# Patient Record
Sex: FEMALE | Race: WHITE | Employment: FULL TIME | URBAN - METROPOLITAN AREA
[De-identification: names, ages, dates, MRNs, and addresses within clinical notes are randomized per-mention and may not be internally consistent; named-entity substitution may affect disease eponyms.]

---

## 2018-02-13 ENCOUNTER — HOSPITAL ENCOUNTER (EMERGENCY)
Facility: HOSPITAL | Age: 43
Discharge: HOME | End: 2018-02-13
Attending: EMERGENCY MEDICINE
Payer: COMMERCIAL

## 2018-02-13 ENCOUNTER — APPOINTMENT (OUTPATIENT)
Dept: CT IMAGING | Facility: HOSPITAL | Age: 43
End: 2018-02-13
Payer: COMMERCIAL

## 2018-02-13 VITALS
BODY MASS INDEX: 37.87 KG/M2 | WEIGHT: 264.56 LBS | RESPIRATION RATE: 18 BRPM | HEIGHT: 70 IN | TEMPERATURE: 99 F | DIASTOLIC BLOOD PRESSURE: 73 MMHG | SYSTOLIC BLOOD PRESSURE: 132 MMHG | OXYGEN SATURATION: 98 % | HEART RATE: 92 BPM

## 2018-02-13 DIAGNOSIS — N20.0 KIDNEY STONE: Primary | ICD-10-CM

## 2018-02-13 LAB
ALBUMIN SERPL-MCNC: 3.4 G/DL (ref 3.5–4.8)
ALP LIVER SERPL-CCNC: 137 U/L (ref 37–98)
ALT SERPL-CCNC: 18 U/L (ref 14–54)
AST SERPL-CCNC: 39 U/L (ref 15–41)
BASOPHILS # BLD AUTO: 0.04 X10(3) UL (ref 0–0.1)
BASOPHILS NFR BLD AUTO: 0.3 %
BILIRUB SERPL-MCNC: 0.4 MG/DL (ref 0.1–2)
BILIRUB UR QL STRIP.AUTO: NEGATIVE
BUN BLD-MCNC: 16 MG/DL (ref 8–20)
CALCIUM BLD-MCNC: 8.9 MG/DL (ref 8.3–10.3)
CHLORIDE: 111 MMOL/L (ref 101–111)
CO2: 20 MMOL/L (ref 22–32)
COLOR UR AUTO: YELLOW
CREAT BLD-MCNC: 1.24 MG/DL (ref 0.55–1.02)
EOSINOPHIL # BLD AUTO: 0.04 X10(3) UL (ref 0–0.3)
EOSINOPHIL NFR BLD AUTO: 0.3 %
ERYTHROCYTE [DISTWIDTH] IN BLOOD BY AUTOMATED COUNT: 19.6 % (ref 11.5–16)
GLUCOSE BLD-MCNC: 107 MG/DL (ref 70–99)
GLUCOSE UR STRIP.AUTO-MCNC: NEGATIVE MG/DL
HCT VFR BLD AUTO: 32.6 % (ref 34–50)
HGB BLD-MCNC: 9.1 G/DL (ref 12–16)
IMMATURE GRANULOCYTE COUNT: 0.05 X10(3) UL (ref 0–1)
IMMATURE GRANULOCYTE RATIO %: 0.4 %
KETONES UR STRIP.AUTO-MCNC: NEGATIVE MG/DL
LIPASE: 66 U/L (ref 73–393)
LYMPHOCYTES # BLD AUTO: 2.77 X10(3) UL (ref 0.9–4)
LYMPHOCYTES NFR BLD AUTO: 19.5 %
M PROTEIN MFR SERPL ELPH: 8.1 G/DL (ref 6.1–8.3)
MCH RBC QN AUTO: 18.1 PG (ref 27–33.2)
MCHC RBC AUTO-ENTMCNC: 27.9 G/DL (ref 31–37)
MCV RBC AUTO: 64.9 FL (ref 81–100)
MONOCYTES # BLD AUTO: 0.89 X10(3) UL (ref 0.1–1)
MONOCYTES NFR BLD AUTO: 6.3 %
NEUTROPHIL ABS PRELIM: 10.4 X10 (3) UL (ref 1.3–6.7)
NEUTROPHILS # BLD AUTO: 10.4 X10(3) UL (ref 1.3–6.7)
NEUTROPHILS NFR BLD AUTO: 73.2 %
NITRITE UR QL STRIP.AUTO: NEGATIVE
PH UR STRIP.AUTO: 6 [PH] (ref 4.5–8)
PLATELET # BLD AUTO: 583 10(3)UL (ref 150–450)
POTASSIUM SERPL-SCNC: 4.9 MMOL/L (ref 3.6–5.1)
PROT UR STRIP.AUTO-MCNC: 30 MG/DL
RBC # BLD AUTO: 5.02 X10(6)UL (ref 3.8–5.1)
RBC #/AREA URNS AUTO: >10 /HPF
RED CELL DISTRIBUTION WIDTH-SD: 43.9 FL (ref 35.1–46.3)
SODIUM SERPL-SCNC: 138 MMOL/L (ref 136–144)
SP GR UR STRIP.AUTO: 1.03 (ref 1–1.03)
UROBILINOGEN UR STRIP.AUTO-MCNC: <2 MG/DL
WBC # BLD AUTO: 14.2 X10(3) UL (ref 4–13)

## 2018-02-13 PROCEDURE — 99284 EMERGENCY DEPT VISIT MOD MDM: CPT

## 2018-02-13 PROCEDURE — 96374 THER/PROPH/DIAG INJ IV PUSH: CPT

## 2018-02-13 PROCEDURE — 96361 HYDRATE IV INFUSION ADD-ON: CPT

## 2018-02-13 PROCEDURE — 96375 TX/PRO/DX INJ NEW DRUG ADDON: CPT

## 2018-02-13 PROCEDURE — 85025 COMPLETE CBC W/AUTO DIFF WBC: CPT | Performed by: EMERGENCY MEDICINE

## 2018-02-13 PROCEDURE — 87086 URINE CULTURE/COLONY COUNT: CPT | Performed by: EMERGENCY MEDICINE

## 2018-02-13 PROCEDURE — 74176 CT ABD & PELVIS W/O CONTRAST: CPT

## 2018-02-13 PROCEDURE — 80053 COMPREHEN METABOLIC PANEL: CPT

## 2018-02-13 PROCEDURE — 83690 ASSAY OF LIPASE: CPT

## 2018-02-13 PROCEDURE — 85025 COMPLETE CBC W/AUTO DIFF WBC: CPT

## 2018-02-13 PROCEDURE — 81001 URINALYSIS AUTO W/SCOPE: CPT | Performed by: EMERGENCY MEDICINE

## 2018-02-13 PROCEDURE — 80053 COMPREHEN METABOLIC PANEL: CPT | Performed by: EMERGENCY MEDICINE

## 2018-02-13 PROCEDURE — 83690 ASSAY OF LIPASE: CPT | Performed by: EMERGENCY MEDICINE

## 2018-02-13 RX ORDER — SODIUM CHLORIDE 9 MG/ML
INJECTION, SOLUTION INTRAVENOUS CONTINUOUS
Status: DISCONTINUED | OUTPATIENT
Start: 2018-02-13 | End: 2018-02-13

## 2018-02-13 RX ORDER — OMEGA-3 FATTY ACIDS/FISH OIL 300-1000MG
CAPSULE ORAL
COMMUNITY
End: 2018-02-14

## 2018-02-13 RX ORDER — SULFAMETHOXAZOLE AND TRIMETHOPRIM 800; 160 MG/1; MG/1
1 TABLET ORAL 2 TIMES DAILY
Qty: 14 TABLET | Refills: 0 | Status: SHIPPED | OUTPATIENT
Start: 2018-02-13 | End: 2018-02-20

## 2018-02-13 RX ORDER — ONDANSETRON 4 MG/1
4 TABLET, ORALLY DISINTEGRATING ORAL ONCE
Status: COMPLETED | OUTPATIENT
Start: 2018-02-13 | End: 2018-02-13

## 2018-02-13 RX ORDER — KETOROLAC TROMETHAMINE 30 MG/ML
30 INJECTION, SOLUTION INTRAMUSCULAR; INTRAVENOUS ONCE
Status: COMPLETED | OUTPATIENT
Start: 2018-02-13 | End: 2018-02-13

## 2018-02-13 RX ORDER — HYDROMORPHONE HYDROCHLORIDE 1 MG/ML
1 INJECTION, SOLUTION INTRAMUSCULAR; INTRAVENOUS; SUBCUTANEOUS ONCE
Status: COMPLETED | OUTPATIENT
Start: 2018-02-13 | End: 2018-02-13

## 2018-02-13 RX ORDER — ONDANSETRON 4 MG/1
TABLET, ORALLY DISINTEGRATING ORAL
Status: COMPLETED
Start: 2018-02-13 | End: 2018-02-13

## 2018-02-13 RX ORDER — ONDANSETRON 2 MG/ML
4 INJECTION INTRAMUSCULAR; INTRAVENOUS ONCE
Status: COMPLETED | OUTPATIENT
Start: 2018-02-13 | End: 2018-02-13

## 2018-02-13 RX ORDER — KETOROLAC TROMETHAMINE 30 MG/ML
INJECTION, SOLUTION INTRAMUSCULAR; INTRAVENOUS
Status: COMPLETED
Start: 2018-02-13 | End: 2018-02-13

## 2018-02-13 RX ORDER — HYDROCODONE BITARTRATE AND ACETAMINOPHEN 5; 325 MG/1; MG/1
1-2 TABLET ORAL EVERY 4 HOURS PRN
Qty: 20 TABLET | Refills: 0 | Status: SHIPPED | OUTPATIENT
Start: 2018-02-13 | End: 2018-02-20

## 2018-02-13 NOTE — ED PROVIDER NOTES
Patient Seen in: [facility] Emergency Department    History   Patient presents with:  Abdomen/Flank Pain (GI/)  Urinary Symptoms (urologic)    Stated Complaint: flank pain, dysuria    HPI    53-year-old female comes the hospital complaint of havin nontender without CVA tenderness  Extremity no clubbing, cyanosis or edema noted.   Full range of motion noted without tenderness  Neuro: No focal deficits noted    ED Course     Labs Reviewed   COMP METABOLIC PANEL (14) - Abnormal; Notable for the followin KIDNEYSTONE 2D RNDR (NO IV,NO ORAL) (CPT=74176)  COMPARISON:  None. INDICATIONS:  abdominal pain  TECHNIQUE:  Unenhanced multislice CT scanning from above the kidneys to below the urinary bladder.   2D rendering are generated on the CT scanner workstation 4 mg (4 mg Intravenous Given 2/13/18 1233)   ketorolac tromethamine (TORADOL) 30 MG/ML injection 30 mg (30 mg Intravenous Given 2/13/18 9853)   HYDROmorphone HCl (DILAUDID) 1 MG/ML injection 1 mg (1 mg Intravenous Given 2/13/18 9182)       ED Course as of

## 2018-02-13 NOTE — ED INITIAL ASSESSMENT (HPI)
Patient c/o right flank pain, hx of kidney stones, feels the same. Sent here from immediate care  to r/o kidney stone vs infection as well. No known fevers.

## 2018-02-14 PROBLEM — R10.9 ACUTE RIGHT FLANK PAIN: Status: ACTIVE | Noted: 2018-02-14

## 2018-02-14 PROBLEM — N13.2 URETERAL STONE WITH HYDRONEPHROSIS: Status: ACTIVE | Noted: 2018-02-14

## 2019-07-20 PROCEDURE — 86255 FLUORESCENT ANTIBODY SCREEN: CPT | Performed by: PHYSICIAN ASSISTANT

## 2019-07-20 PROCEDURE — 86376 MICROSOMAL ANTIBODY EACH: CPT | Performed by: PHYSICIAN ASSISTANT

## 2019-08-05 ENCOUNTER — HOSPITAL ENCOUNTER (OUTPATIENT)
Dept: ULTRASOUND IMAGING | Facility: HOSPITAL | Age: 44
Discharge: HOME | End: 2019-08-05
Attending: OTOLARYNGOLOGY
Payer: COMMERCIAL

## 2019-08-05 DIAGNOSIS — E04.1 THYROID NODULE: ICD-10-CM

## 2019-08-05 PROCEDURE — 88173 CYTOPATH EVAL FNA REPORT: CPT | Performed by: OTOLARYNGOLOGY

## 2019-08-05 PROCEDURE — 10005 FNA BX W/US GDN 1ST LES: CPT | Performed by: OTOLARYNGOLOGY

## 2019-08-05 PROCEDURE — 10006 FNA BX W/US GDN EA ADDL: CPT | Performed by: OTOLARYNGOLOGY

## 2019-08-05 NOTE — PROCEDURES
[facility]  Procedure Note    Khushboo Guerin Patient Status:  Outpatient    1975 MRN [id number]   Location [address] [place] [address] Attending Hannah Cantrell MD   Hosp Day # 0 PCP Ania Patel MD     Procedure: US thyroid FNA

## 2019-08-08 NOTE — PROGRESS NOTES
Advised FNA appears benign. Scheduled f/u on 9/16/19 with Dr. Candida Panda. Patient verbalizes understanding.

## 2020-01-30 PROBLEM — B37.3 VAGINAL YEAST INFECTION: Status: ACTIVE | Noted: 2020-01-30

## 2021-04-25 ENCOUNTER — APPOINTMENT (OUTPATIENT)
Dept: GENERAL RADIOLOGY | Facility: HOSPITAL | Age: 46
DRG: 177 | End: 2021-04-25
Attending: EMERGENCY MEDICINE
Payer: COMMERCIAL

## 2021-04-25 ENCOUNTER — HOSPITAL ENCOUNTER (INPATIENT)
Facility: HOSPITAL | Age: 46
LOS: 3 days | Discharge: HOME | DRG: 177 | End: 2021-04-28
Attending: EMERGENCY MEDICINE | Admitting: INTERNAL MEDICINE
Payer: COMMERCIAL

## 2021-04-25 DIAGNOSIS — U07.1 PNEUMONIA DUE TO COVID-19 VIRUS: Primary | ICD-10-CM

## 2021-04-25 DIAGNOSIS — J12.82 PNEUMONIA DUE TO COVID-19 VIRUS: Primary | ICD-10-CM

## 2021-04-25 DIAGNOSIS — R09.02 HYPOXIA: ICD-10-CM

## 2021-04-25 PROBLEM — E66.2 CLASS 2 OBESITY WITH ALVEOLAR HYPOVENTILATION WITHOUT SERIOUS COMORBIDITY WITH BODY MASS INDEX (BMI) OF 37.0 TO 37.9 IN ADULT (HCC): Status: ACTIVE | Noted: 2021-04-25

## 2021-04-25 PROBLEM — G47.33 OBSTRUCTIVE SLEEP APNEA: Chronic | Status: ACTIVE | Noted: 2021-04-25

## 2021-04-25 PROCEDURE — 71045 X-RAY EXAM CHEST 1 VIEW: CPT | Performed by: EMERGENCY MEDICINE

## 2021-04-25 PROCEDURE — XW033E5 INTRODUCTION OF REMDESIVIR ANTI-INFECTIVE INTO PERIPHERAL VEIN, PERCUTANEOUS APPROACH, NEW TECHNOLOGY GROUP 5: ICD-10-PCS | Performed by: INTERNAL MEDICINE

## 2021-04-25 PROCEDURE — 3E0333Z INTRODUCTION OF ANTI-INFLAMMATORY INTO PERIPHERAL VEIN, PERCUTANEOUS APPROACH: ICD-10-PCS | Performed by: EMERGENCY MEDICINE

## 2021-04-25 PROCEDURE — 99223 1ST HOSP IP/OBS HIGH 75: CPT | Performed by: INTERNAL MEDICINE

## 2021-04-25 RX ORDER — BENZONATATE 100 MG/1
100 CAPSULE ORAL 3 TIMES DAILY PRN
Status: DISCONTINUED | OUTPATIENT
Start: 2021-04-25 | End: 2021-04-28

## 2021-04-25 RX ORDER — ACETAMINOPHEN 160 MG
2000 TABLET,DISINTEGRATING ORAL DAILY
Status: DISCONTINUED | OUTPATIENT
Start: 2021-04-25 | End: 2021-04-28

## 2021-04-25 RX ORDER — ENOXAPARIN SODIUM 100 MG/ML
40 INJECTION SUBCUTANEOUS DAILY
Status: DISCONTINUED | OUTPATIENT
Start: 2021-04-25 | End: 2021-04-28

## 2021-04-25 RX ORDER — ASCORBIC ACID 500 MG
1000 TABLET ORAL DAILY
Status: DISCONTINUED | OUTPATIENT
Start: 2021-04-25 | End: 2021-04-28

## 2021-04-25 RX ORDER — DEXAMETHASONE SODIUM PHOSPHATE 10 MG/ML
10 INJECTION, SOLUTION INTRAMUSCULAR; INTRAVENOUS ONCE
Status: COMPLETED | OUTPATIENT
Start: 2021-04-25 | End: 2021-04-25

## 2021-04-25 RX ORDER — MELATONIN
3 NIGHTLY PRN
Status: DISCONTINUED | OUTPATIENT
Start: 2021-04-25 | End: 2021-04-28

## 2021-04-25 RX ORDER — ACETAMINOPHEN 500 MG
1000 TABLET ORAL ONCE
Status: COMPLETED | OUTPATIENT
Start: 2021-04-25 | End: 2021-04-25

## 2021-04-25 RX ORDER — GUAIFENESIN 600 MG
600 TABLET, EXTENDED RELEASE 12 HR ORAL 2 TIMES DAILY
Status: DISCONTINUED | OUTPATIENT
Start: 2021-04-25 | End: 2021-04-28

## 2021-04-25 RX ORDER — ZINC SULFATE 50(220)MG
220 CAPSULE ORAL 2 TIMES DAILY
Status: DISCONTINUED | OUTPATIENT
Start: 2021-04-25 | End: 2021-04-28

## 2021-04-25 RX ORDER — ALBUTEROL SULFATE 90 UG/1
2 AEROSOL, METERED RESPIRATORY (INHALATION) EVERY 6 HOURS PRN
COMMUNITY

## 2021-04-25 RX ORDER — DEXAMETHASONE SODIUM PHOSPHATE 10 MG/ML
10 INJECTION, SOLUTION INTRAMUSCULAR; INTRAVENOUS DAILY
Status: DISCONTINUED | OUTPATIENT
Start: 2021-04-26 | End: 2021-04-28

## 2021-04-25 RX ORDER — ACETAMINOPHEN 325 MG/1
650 TABLET ORAL EVERY 6 HOURS PRN
Status: DISCONTINUED | OUTPATIENT
Start: 2021-04-25 | End: 2021-04-28

## 2021-04-25 RX ORDER — ALBUTEROL SULFATE 90 UG/1
4 AEROSOL, METERED RESPIRATORY (INHALATION) EVERY 4 HOURS PRN
Status: DISCONTINUED | OUTPATIENT
Start: 2021-04-25 | End: 2021-04-28

## 2021-04-25 RX ORDER — ONDANSETRON 2 MG/ML
4 INJECTION INTRAMUSCULAR; INTRAVENOUS EVERY 6 HOURS PRN
Status: DISCONTINUED | OUTPATIENT
Start: 2021-04-25 | End: 2021-04-28

## 2021-04-25 NOTE — ED PROVIDER NOTES
Patient Seen in: [facility] Emergency Department      History   Patient presents with:  Covid  Difficulty Breathing    Stated Complaint: Covid + last monday. States cannot get sats above 90% at home.      HPI/Subjective:   HPI    51-year-old female p the room.  Personal protective equipment including droplet mask, eye protection, and gloves were worn throughout the duration of the exam.  Handwashing was performed prior to and after the exam.  Stethoscope and any equipment used during my examination was NATRIURETIC PEPTIDE - Normal   LDH - Normal   FERRITIN - Normal   TROPONIN I - Normal   CK CREATINE KINASE (NOT CREATININE) - Normal   CBC WITH DIFFERENTIAL WITH PLATELET    Narrative:      The following orders were created for panel order CBC WITH DIFFEREN bilateral lungs with patchy consolidation in the lower lungs are concerning for atypical pneumonia from COVID-19 infection, with other etiologies not entirely excluded. Clinical correlation recommended.     Dictated by (CST): Harleen Armando MD on 4/25/202 pm    Follow-up:  No follow-up provider specified.         Medications Prescribed:  Current Discharge Medication List                          Hospital Problems     Present on Admission  Date Reviewed: 1/30/2020        ICD-10-CM Noted POA    * (Principal) P

## 2021-04-25 NOTE — CONSULTS
[address] Santiago Wilcox Patient Status:  Inpatient    1975 MRN [id number]   [facility] 5NW-A Attending Carolynn Huggins MD   Hosp Day # 0 PCP Nuris Chavez MD       Reason for Consultation:  Covid 19 HCl (ZOFRAN) injection 4 mg, 4 mg, Intravenous, Q6H PRN    Review of Systems:    Completed. See pertinent positives and negatives in the the HPI. Constitutional: as above  Eyes: Negative for eye drainage and redness.   Ears, nose, mouth, throat: Negative PCT <0.05       Cardiac  Recent Labs   Lab 04/25/21  1219 04/25/21  1337   TROP <0.045 <0.045   PBNP 68  --        Creatinine Kinase  Recent Labs   Lab 04/25/21  1219   CK 42       Inflammatory Markers  Recent Labs   Lab 04/25/21  1219   CRP 5.11*   ZENAIDA above  - wean O2 as able  - prone as tolerated, should also encourage ambulation and IS when not in bed    Thank you for allowing me to participate in the care of this patient. Please do not hesitate to call if you have any questions.    I will continue to

## 2021-04-25 NOTE — H&P
[facility]                                                               History & Physical         Rashad Moorefield Patient Status:  Emergency    1975 MRN [id number]   Location [address] [facility] Attending Taniya Alvarez MD noted in the the HPI. Physical Exam:     Vital signs: Blood pressure 122/76, pulse 114, temperature 99 °F (37.2 °C), temperature source Temporal, resp.  rate 22, height 5' 10\" (1.778 m), weight 260 lb (117.9 kg), last menstrual period 04/02/2021, SpO2 9 INDICATIONS:  Covid + last monday.  States cannot get sats above 90% at home.       PATIENT STATED HISTORY: (As transcribed by Technologist)  Patient offered no additional history at this time.            FINDINGS:  Patchy interstitial and ground-glass op

## 2021-04-25 NOTE — PLAN OF CARE
NURSING ADMISSION NOTE      Patient admitted via Cart  Oriented to room. Safety precautions initiated. Bed in low position. Call light in reach. Admission Navigator completed    Pt A&Ox4. 2L via nasal cannula. . RA baseline. Telemetry monitored. Brooklyn Walker RN  Outcome: Progressing  4/25/2021 1713 by Brooklyn Walker RN  Outcome: Progressing     Problem: SAFETY ADULT - FALL  Goal: Free from fall injury  Description: INTERVENTIONS:  - Assess pt frequently for physical needs  - Identify cognitive changes in respiratory status  - Assess for changes in mentation and behavior  - Position to facilitate oxygenation and minimize respiratory effort  - Oxygen supplementation based on oxygen saturation or ABGs  - Provide Smoking Cessation handout, if applic

## 2021-04-25 NOTE — ED INITIAL ASSESSMENT (HPI)
Patient here with report of fever, body aches, cough and diarrhea 10 days ago. Patient was COVID + on Monday. Today patient states her O2 at home was 90-91% with mild SOB.

## 2021-04-26 PROCEDURE — 99232 SBSQ HOSP IP/OBS MODERATE 35: CPT | Performed by: INTERNAL MEDICINE

## 2021-04-26 RX ORDER — PANTOPRAZOLE SODIUM 40 MG/1
40 TABLET, DELAYED RELEASE ORAL
Status: DISCONTINUED | OUTPATIENT
Start: 2021-04-26 | End: 2021-04-28

## 2021-04-26 NOTE — PROGRESS NOTES
COVID-19 Daily Discharge Readiness-Nursing    O2 Sat at Rest:    92 %  2 L   O2 Sat with Exertion:   88  % on   2 L    Temperature max from last 24 hrs: Temp (24hrs), Av.1 °F (37.3 °C), Min:98.3 °F (36.8 °C), Max:101.1 °F (38.4 °C)    [facility]

## 2021-04-26 NOTE — PROGRESS NOTES
[facility]  Progress Note    Christiane Osullivan Patient Status:  Inpatient    1975 MRN [id number]   [facility] 5NW-A Attending Costa Dill MD   Hosp Day # 1 PCP Bouchra Mehta MD     CC: Cough, shortness of breath, Covid CREATSERUM 0.79 04/26/2021    BUN 19 04/26/2021     04/26/2021    K 3.8 04/26/2021     04/26/2021    CO2 26.0 04/26/2021     04/26/2021    CA 9.0 04/26/2021    ALB 3.1 04/26/2021    ALKPHO 57 04/26/2021    BILT 0.3 04/26/2021    TP 8.0 0 PLAN:        1. Covid 19 pneumonia with hypoxia, cough, shortness of breath, fever, body aches, diarrhea  1. Will follow Covid inflammatory markers  2. Oxygen as needed  3. Incentive spirometry every hour while awake  4. Frequent proning  5. ID consult  6.

## 2021-04-26 NOTE — PLAN OF CARE
Problem: Patient/Family Goals  Goal: Patient/Family Long Term Goal  Description: Patient's Long Term Goal:   Discharge home    Interventions:  -  Assistance from CM/SW if needed  - See additional Care Plan goals for specific interventions  Outcome: Progr Problem: DISCHARGE PLANNING  Goal: Discharge to home or other facility with appropriate resources  Description: INTERVENTIONS:  - Identify barriers to discharge w/pt and caregiver  - Include patient/family/discharge partner in discharge Matt Olmstead electrolyte replacements, including rhythm and repeat lab results as appropriate  - Fluid restriction as ordered  - Instruct patient on fluid and nutrition restrictions as appropriate  Outcome: Progressing

## 2021-04-26 NOTE — PROGRESS NOTES
COVID-19 Daily Discharge Readiness-Nursing    O2 Sat at Rest:   92  %   O2 Sat with Exertion:  87  % on   2 liters   Temperature max from last 24 hrs: Temp (24hrs), Av.2 °F (37.3 °C), Min:98.3 °F (36.8 °C), Max:101.1 °F (38.4 °C)    Inflammatory Marker

## 2021-04-26 NOTE — PROGRESS NOTES
[address] K[address] Ponce Patient Status:  Inpatient    1975 MRN [id number]   [facility] 5NW-A Attending Bibiana Pritchett MD   Hosp Day # 1 PCP John Key MD NEPRELIM 1.74   WBC 2.9*   .0         Recent Labs   Lab 04/25/21  1219 04/26/21  0637   * 123*   BUN 14 19*   CREATSERUM 0.96 0.79   GFRAA 84 106   GFRNAA 73 92   CA 8.6 9.0   ALB 3.3* 3.1*    141   K 3.4* 3.8    109   CO2 28.0

## 2021-04-26 NOTE — DIETARY NOTE
[address] [facility], [address]     Admitting diagnosis:  Hypoxia [R09.02]  Pneumonia due to COVID-19 virus [U07.1, J12.82]    Ht: 177.8 cm (5' 10\")  Wt: 117.9 kg (260 lb). Body mass index is 37.31 kg/m².   IBW: 68kg    Wt Reading

## 2021-04-27 PROCEDURE — 99232 SBSQ HOSP IP/OBS MODERATE 35: CPT | Performed by: INTERNAL MEDICINE

## 2021-04-27 NOTE — PLAN OF CARE
Problem: Patient/Family Goals  Goal: Patient/Family Long Term Goal  Description: Patient's Long Term Goal:   Discharge home    Interventions:  -  Assistance from CM/SW if needed  - See additional Care Plan goals for specific interventions  Outcome: Progr strengthening/mobility  - Encourage toileting schedule  Outcome: Progressing     Problem: DISCHARGE PLANNING  Goal: Discharge to home or other facility with appropriate resources  Description: INTERVENTIONS:  - Identify barriers to discharge w/pt and careg imbalances  - Administer electrolyte replacement as ordered  - Monitor response to electrolyte replacements, including rhythm and repeat lab results as appropriate  - Fluid restriction as ordered  - Instruct patient on fluid and nutrition restrictions as a

## 2021-04-27 NOTE — PROGRESS NOTES
[address] Ponce Patient Status:  Inpatient    1975 MRN [id number]   [facility] 5NW-A Attending Terra Carlisle MD   Hosp Day # 2 PCP Jass Macdonald MD  109 111   CO2 28.0 26.0 26.0   ALKPHO 64 57 53   AST 14* 11* 9*   ALT 13 13 17   BILT 0.4 0.3 0.2   TP 8.5* 8.0 7.6           Problem list reviewed:  Patient Active Problem List:     Community acquired pneumonia     Microcytic anemia     Ureteral

## 2021-04-27 NOTE — PROGRESS NOTES
04/27/21 1100   Mobility   O2 walk?  Yes   SPO2% on Room Air at Rest 94   SPO2% on Oxygen at Rest 0   At rest oxygen flow (liters per minute) 0   SPO2% Ambulation on Room Air 89   SPO2% Ambulation on Oxygen 0   Ambulation oxygen flow (liters per minute)

## 2021-04-27 NOTE — PLAN OF CARE
Problem: Patient/Family Goals  Goal: Patient/Family Long Term Goal  Description: Patient's Long Term Goal:   Discharge home    Interventions:  -  Assistance from CM/SW if needed  - See additional Care Plan goals for specific interventions  4/26/2021 3529 assessment.  - Educate pt/family on patient safety including physical limitations  - Instruct pt to call for assistance with activity based on assessment  - Modify environment to reduce risk of injury  - Provide assistive devices as appropriate  - Consider the need for suctioning and perform as needed  - Assess and instruct to report SOB or any respiratory difficulty  - Respiratory Therapy support as indicated  - Manage/alleviate anxiety  - Monitor for signs/symptoms of CO2 retention  4/26/2021 2226 by Jonelle Esquivel

## 2021-04-27 NOTE — PROGRESS NOTES
[facility]  Progress Note    Coco Spray Patient Status:  Inpatient    1975 MRN [id number]   [facility] 5NW-A Attending Arjun Major MD   Hosp Day # 2 PCP Ngozi Werner MD     CC: Cough, shortness of breath, Covid  04/27/2021    CA 9.2 04/27/2021    ALB 3.0 04/27/2021    ALKPHO 53 04/27/2021    BILT 0.2 04/27/2021    TP 7.6 04/27/2021    AST 9 04/27/2021    ALT 17 04/27/2021    DDIMER <0.27 04/27/2021    CRP 2.01 04/27/2021     COVID-19 Lab Results    COVI Covid inflammatory markers  2. Oxygen as needed-wean off oxygen as able  3. Incentive spirometry every hour while awake  4. Frequent proning  5. ID consult  6. IV remdesivir-on third dose today  7. on IV dexamethasone for hypoxia  8.  On vitamin C, vitamin

## 2021-04-27 NOTE — COVID NURSING ASSESSMENT
COVID-19 Daily Discharge Readiness-Nursing    O2 Sat at Rest:   93  % 1L NC  O2 Sat with Exertion:  88  % on  2  liters   Temperature max from last 24 hrs: Temp (24hrs), Av.6 °F (37 °C), Min:98.4 °F (36.9 °C), Max:99 °F (37.2 °C)    Inflammatory Marker

## 2021-04-27 NOTE — PROGRESS NOTES
COVID-19 Daily Discharge Readiness-Nursing    O2 Sat at Rest:  94% on Room Air   O2 Sat with Exertion: 89% on Room Air  Temperature max from last 24 hrs: Temp (24hrs), Av.4 °F (36.9 °C), Min:97.9 °F (36.6 °C), Max:98.7 °F (37.1 °C)    Eduardo Alva and updated with the plan of care.

## 2021-04-27 NOTE — PAYOR COMM NOTE
Appropriate for inpatient status per guidelines for fever, cough, body aches due to COVID pneumonia.       -------------  ADMISSION REVIEW     Payor: [address] [place] [address] #:  [id number]  Authorization Number: [id number]       ED transmission during my interaction with the patient were taken. The patient was already wearing a droplet mask on my arrival to the room.  Personal protective equipment including droplet mask, eye protection, and gloves were worn throughout the duration of MCH 23.2 (*)     MCHC 30.5 (*)     Lymphocyte Absolute 0.92 (*)     All other components within normal limits   PRO BETA NATRIURETIC PEPTIDE - Normal   LDH - Normal   FERRITIN - Normal   TROPONIN I - Normal   CK CREATINE KINASE (NOT CREATININE) - Normal management of medical condition. Patient was stable in the emergency department and will be transferred to floor for further definitive care. Patient's questions were answered.       Disposition and Plan     Clinical Impression:  Pneumonia due to COVID-19 rebound tenderness  Neurologic: Awake alert oriented x3, no focal neurological deficits. Musculoskeletal: Full range of motion of all extremities. No swelling noted. Integument: No lesions. No erythema. Psychiatric: Appropriate mood and affect.       Rica Madison wbc is nl and PCT neg  - may be out of the window for CCP  - start RDV  - cont dexa  - follow O2 sats, would consider actemra if develops hypoxia with O2 requirements above 6L  - prone as able, otherwise encourage ambulation and IS  - trend inflammatory ma on 2L     Continue Remdesivir, started at 10 days of symptoms and with hypoxia on 2L  Continue decadron, for hypoxia, until improved weaning off 02  Prophylactic anticoagulation                 04/26/21 0819 98.5 °F (36.9 °C) 91 32Abnormal  125/65 94 % — N

## 2021-04-28 VITALS
OXYGEN SATURATION: 93 % | HEIGHT: 70 IN | HEART RATE: 70 BPM | WEIGHT: 260 LBS | BODY MASS INDEX: 37.22 KG/M2 | RESPIRATION RATE: 16 BRPM | DIASTOLIC BLOOD PRESSURE: 74 MMHG | TEMPERATURE: 98 F | SYSTOLIC BLOOD PRESSURE: 129 MMHG

## 2021-04-28 PROCEDURE — 99239 HOSP IP/OBS DSCHRG MGMT >30: CPT | Performed by: HOSPITALIST

## 2021-04-28 RX ORDER — BENZONATATE 200 MG/1
200 CAPSULE ORAL 3 TIMES DAILY PRN
Qty: 30 CAPSULE | Refills: 0 | Status: SHIPPED | OUTPATIENT
Start: 2021-04-28

## 2021-04-28 NOTE — COVID NURSING ASSESSMENT
COVID-19 Daily Discharge Readiness-Nursing    O2 Sat at Rest:  92  %   Temperature max from last 24 hrs: Temp (24hrs), Av.1 °F (36.7 °C), Min:97.9 °F (36.6 °C), Max:98.5 °F (36.9 °C)    Inflammatory Markers:   Recent Labs   Lab 21  9125 21

## 2021-04-28 NOTE — PROGRESS NOTES
[address] Patient Status:  Inpatient    1975 MRN [id number]   [facility] 5NW-A Attending Jodie Alicea MD   Hosp Day # 3 PCP Christiano Pablo MD 26.0   ALKPHO 57 53 54   AST 11* 9* 6*   ALT 13 17 15   BILT 0.3 0.2 0.2   TP 8.0 7.6 7.0           Problem list reviewed:  Patient Active Problem List:     Community acquired pneumonia     Microcytic anemia     Ureteral stone with hydronephrosis     Acute

## 2021-04-28 NOTE — DISCHARGE PLANNING
NURSING DISCHARGE NOTE    Discharged Home via Wheelchair. Accompanied by Support staff  Belongings Taken by patient/family. PIV removed   Discharge navigator complete. Discharge instructions reviewed with patient at bedside.    All questions & concerns

## 2021-04-28 NOTE — COVID NURSING ASSESSMENT
COVID-19 Daily Discharge Readiness-Nursing    O2 Sat at Rest:  SPO2% on Room Air at Rest: 95  % on RA  O2 Sat with Exertion: 90  % on Ambulation oxygen flow (liters per minute): 0  liters   Temperature max from last 24 hrs: Temp (24hrs), Av.1 °F (36.7

## 2021-04-29 NOTE — PAYOR COMM NOTE
--------------  DISCHARGE REVIEW    Payor: [address] #:  [id number]  Authorization Number: [id number]    Admit date: 4/25/21  Admit time:   3:06 PM  Discharge Date: 4/28/2021  1:53 PM     Admitting Physician: Rosalinda Gipson

## 2021-04-30 NOTE — DISCHARGE SUMMARY
[facility]PITAL[facility]  DISCHARGE SUMMARY     Watt Organ Patient Status:  Inpatient    1975 MRN [id number]   [facility] 5NW-A Attending No att. providers found   Hosp Day # 3 PCP Niles Peters MD     Date of Admission: / Quantity: 30 capsule  Refills: 0        CONTINUE taking these medications      Instructions Prescription details   Albuterol Sulfate  (90 Base) MCG/ACT Aers      Inhale 2 puffs into the lungs every 6 (six) hours as needed for Wheezing.    Refills: 0

## (undated) NOTE — LETTER
04/28/21    Coco Spray      To Whom It May Concern:     This letter has been written at the patient's request. The above patient was seen at [facility] for treatment of a medical condition from 4/25/2021 - 4/28/2021    The patient may return to wo

## (undated) NOTE — LETTER
February 13, 2018    Patient: Tommie Jordan   Date of Visit: 2/13/2018       To Whom It May Concern:    Merline Vasquez was seen and treated in our emergency department on 2/13/2018. She can return to work on 2/16/18.     If you have any questions or co

## (undated) NOTE — ED AVS SNAPSHOT
Adrian Leon   MRN: [id number]    Department:  [facility] Emergency Department   Date of Visit:  2/13/2018           Disclosure     Insurance plans vary and the physician(s) referred by the ER may not be covered by your plan.  Please contact you tell this physician (or your personal doctor if your instructions are to return to your personal doctor) about any new or lasting problems. The primary care or specialist physician will see patients referred from the [facility] Emergency Department.  Saturnino Durán